# Patient Record
(demographics unavailable — no encounter records)

---

## 2025-04-30 NOTE — HISTORY OF PRESENT ILLNESS
[de-identified] : Patient is here for evaluation of her left knee she had her to work and fell directly on the knee anteriorly she points to anterior aspect as a source of pain she is been taking anti-inflammatories ibuprofen 800 mg try some home exercises she feels she is improving  She had MRI done today at a [facility] does no official report but I did review the films I do not see any tears or bone contusions there is a small joint effusion there is no ligamentous  Physical exam today there is no erythema no joint effusion she is nontender over the joint line she has diffuse pain anteriorly  Resolving left knee contusion  Recommend exercises from the [facility] I will switch her to Mobic side effects discussed she will take 1 pill after dinner with food talked about getting her back to work she wants to get back to working 1 week old we will see her back in 4 weeks, even if the MRI does come back with an inferior tear of the posterior horn of the medial meniscus she is asymptomatic in that area and will continue with our plan of home exercises anti-inflammatories and back to work

## 2025-07-29 NOTE — HISTORY OF PRESENT ILLNESS
[FreeTextEntry1] : ----31 Y/O  S/P MISCARRIAGE 25;;PT STILL SPOTTING. PT C/O CRAMPS ON AND OFF;-[facility]. PT WAS ABOUT 5 WEEKS PREGNANT. PMHX;/ASTHMA PSHX;/ SOCIAL HX;-ETOH -CIGG STD;   /        STD PREVENTION DISCUSSED FAMILY HISTORY OF BREAST CANCER;NO REVIEW OF SYMPTOMS DONE; ALLERGIES; pt answered NKDA Medication reconciliation was completed by reviewing, with the patient's involvement, the patient's current outpatient medications and those ordered for the patient today.         PE; ABDOMEN;SOFT NT ND EXT -CCE     A;P;MISCARRIAGE -SONO -HCG LEVEL -F-U AFTER ABOVE.

## 2025-07-30 NOTE — HISTORY OF PRESENT ILLNESS
[FreeTextEntry1] : ----33 Y/O  S/P MISCARRIAGE 25;;PT STILL SPOTTING. PT C/O CRAMPS ON AND OFF;-[facility]. PT WAS ABOUT 5 WEEKS PREGNANT. HCG  SONO REVIEWED;RPOC PMHX;/ASTHMA PSHX;/ SOCIAL HX;-ETOH -CIGG STD;   /        STD PREVENTION DISCUSSED FAMILY HISTORY OF BREAST CANCER;NO REVIEW OF SYMPTOMS DONE; ALLERGIES; pt answered NKDA Medication reconciliation was completed by reviewing, with the patient's involvement, the patient's current outpatient medications and those ordered for the patient today.         PE; ABDOMEN;SOFT NT ND EXT -CCE     A;P;MISCARRIAGE -SONO REVIEWED -HCG LEVEL REVIEWED -METHERGINE 0.2MG PO TID X 3 DAYS -F-U AFTER ABOVE.